# Patient Record
Sex: FEMALE | Race: OTHER
[De-identification: names, ages, dates, MRNs, and addresses within clinical notes are randomized per-mention and may not be internally consistent; named-entity substitution may affect disease eponyms.]

---

## 2019-07-10 ENCOUNTER — HOSPITAL ENCOUNTER (OUTPATIENT)
Dept: HOSPITAL 61 - US | Age: 49
Discharge: HOME | End: 2019-07-10
Attending: FAMILY MEDICINE
Payer: COMMERCIAL

## 2019-07-10 DIAGNOSIS — C73: Primary | ICD-10-CM

## 2019-07-10 PROCEDURE — 10005 FNA BX W/US GDN 1ST LES: CPT

## 2019-07-10 PROCEDURE — 88305 TISSUE EXAM BY PATHOLOGIST: CPT

## 2019-07-10 PROCEDURE — 76942 ECHO GUIDE FOR BIOPSY: CPT

## 2019-07-10 PROCEDURE — 88173 CYTOPATH EVAL FNA REPORT: CPT

## 2019-07-12 NOTE — PATHOLOGY
Note

[facility] Accession Number: [id number]

   TESTS               RESULT  FLAG  UNITS    REF RANGE  LAB

------------------------------------------------------------

   Clinician Provided Cytology Information

   No. of containers..01 Other (Miscellaneous)

Source:                        [A]                        01

   LT THYROID

DIAGNOSIS:                     [A]                        02

   LT THYROID

   POSITIVE FOR MALIGNANT CELLS.

   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

   EPITHELIUM WITH PAPILLARY STRUCTURES, NUCLEAR PSEUDOINCLUSIONS AND GROOVES

   AND PSAMMOMA BODIES.

   THIS INTERPRETATION INCLUDES EVALUATION OF A CELL BLOCK.

   COMMENT,

   THIS CASE IS ALSO REVIEWED BY DR. JOSE LUTZ. AN ATTEMPT WAS MADE TO

   CALL DR. BOES OFFICE ON 11/12/19 AT 2PM.

   THE RNA RETAINED VIAL WILL BE SENT FOR CONFIRMATION.

Pathologist ICD10:                                        02

   C73

Signed out by:                                            02

   Tavo Herrera MD, Pathologist

   NPI- [id number]

Performed by:                                             01

   Betty Rodgers, Cytotechnologist ([facility])

   August Rizo Cytotechnologist ([facility])

Gross description:                                        01

   30ML, RED, CLOUDY

   /LCS



------------------------------------------------------------

    FLAG LEGEND:

    L-Low Normal,H-High Normal,LL-Alert Low,HH-Alert High

    <-Panic Low,>-Panic High,A-Abnormal,AA-Critical Abnormal

------------------------------------------------------------



Performed at:

[address] [facility]

   [address]

   [place]  [address]

   Shaw Lopez MD, Phone: [phone]

[address] [facility]

   [address] [place]  [address]

   Watson Olsen MD, Phone: [phone]

Specimen Comment: A courtesy copy of this report has been sent to

Specimen Comment: [phone], [phone].

Specimen Comment: Report sent to DR TATE / DR BOSE

Specimen Comment: A duplicate report has been generated due to demographic updates.

***Performed at:  01

   [address], [place], [place]  [id number]

   MD Shaw Lopez MD Phone:  [phone]

## 2019-07-19 ENCOUNTER — HOSPITAL ENCOUNTER (OUTPATIENT)
Dept: HOSPITAL 61 - CT | Age: 49
Discharge: HOME | End: 2019-07-19
Attending: FAMILY MEDICINE
Payer: COMMERCIAL

## 2019-07-19 DIAGNOSIS — E04.2: Primary | ICD-10-CM

## 2019-07-19 PROCEDURE — 70491 CT SOFT TISSUE NECK W/DYE: CPT
